# Patient Record
Sex: FEMALE | Race: WHITE | NOT HISPANIC OR LATINO | URBAN - METROPOLITAN AREA
[De-identification: names, ages, dates, MRNs, and addresses within clinical notes are randomized per-mention and may not be internally consistent; named-entity substitution may affect disease eponyms.]

---

## 2019-02-09 ENCOUNTER — EMERGENCY (EMERGENCY)
Facility: HOSPITAL | Age: 34
LOS: 1 days | Discharge: HOME | End: 2019-02-09
Admitting: EMERGENCY MEDICINE
Payer: COMMERCIAL

## 2019-02-09 VITALS
RESPIRATION RATE: 17 BRPM | DIASTOLIC BLOOD PRESSURE: 75 MMHG | TEMPERATURE: 99 F | HEART RATE: 96 BPM | SYSTOLIC BLOOD PRESSURE: 134 MMHG | OXYGEN SATURATION: 95 % | WEIGHT: 149.91 LBS

## 2019-02-09 PROCEDURE — 99284 EMERGENCY DEPT VISIT MOD MDM: CPT

## 2019-02-09 PROCEDURE — 96365 THER/PROPH/DIAG IV INF INIT: CPT

## 2019-02-09 PROCEDURE — 99284 EMERGENCY DEPT VISIT MOD MDM: CPT | Mod: 25

## 2019-02-09 RX ORDER — DEXAMETHASONE 0.5 MG/5ML
10 ELIXIR ORAL ONCE
Qty: 0 | Refills: 0 | Status: COMPLETED | OUTPATIENT
Start: 2019-02-09 | End: 2019-02-09

## 2019-02-09 RX ORDER — SODIUM CHLORIDE 9 MG/ML
1000 INJECTION INTRAMUSCULAR; INTRAVENOUS; SUBCUTANEOUS ONCE
Qty: 0 | Refills: 0 | Status: COMPLETED | OUTPATIENT
Start: 2019-02-09 | End: 2019-02-09

## 2019-02-09 RX ADMIN — SODIUM CHLORIDE 1000 MILLILITER(S): 9 INJECTION INTRAMUSCULAR; INTRAVENOUS; SUBCUTANEOUS at 16:48

## 2019-02-09 RX ADMIN — Medication 102 MILLIGRAM(S): at 16:05

## 2019-02-09 RX ADMIN — Medication 10 MILLIGRAM(S): at 16:48

## 2019-02-09 RX ADMIN — SODIUM CHLORIDE 2000 MILLILITER(S): 9 INJECTION INTRAMUSCULAR; INTRAVENOUS; SUBCUTANEOUS at 16:05

## 2019-02-09 NOTE — ED ADULT NURSE NOTE - NSIMPLEMENTINTERV_GEN_ALL_ED
Implemented All Universal Safety Interventions:  [place] to call system. Call bell, personal items and telephone within reach. Instruct patient to call for assistance. Room bathroom lighting operational. Non-slip footwear when patient is off stretcher. Physically safe environment: no spills, clutter or unnecessary equipment. Stretcher in lowest position, wheels locked, appropriate side rails in place.

## 2019-02-09 NOTE — ED ADULT TRIAGE NOTE - PRO INTERPRETER NEED 2
Discussion/Summary   Dear Noam Olson,   Your recent labs show prediabetes. Let me know if you want to see the nutritionist to discuss dietary modification and get as much exercise as possible. Cholesterol looks ok. Let me know if you have any questions. Thanks, Tara Sebastian PA-C        Verified Results  CBC WITH AUTOMATED DIFFERENTIAL [id number] 08:28AM Tara Sebastian     Test Name Result Flag Reference   WHITE BLOOD COUNT 5.9 K/mcL  4.2-11.0   RED CELL COUNT 4.71 mil/mcL  4.00-5.20   HEMOGLOBIN 12.9 g/dl  12.0-15.5   HEMATOCRIT 41.1 %  36.0-46.5   MEAN CORPUSCULAR VOLUME 87.3 fL  78.0-100.0   MEAN CORPUSCULAR HEMOGLOBIN 27.4 pg  26.0-34.0   MEAN CORPUSCULAR HGB CONC 31.4 g/dl L 32.0-36.5   RDW-CV 14.1 %  11.0-15.0   PLATELET COUNT 250 K/mcL  140-450   GEORGES% 52 %     LYM% 35 %     MON% 9 %     EOS% 2 %     BASO% 1 %     GEORGES ABS 3.1 K/mcL  1.8-7.7   LYM ABS 2.1 K/mcL  1.0-4.8   MON ABS 0.5 K/mcL  0.3-0.9   EOS ABS 0.1 K/mcL  0.1-0.5   BASO ABS 0.0 K/mcL  0.0-0.3   DIFF TYPE      AUTOMATED DIFFERENTIAL   NRBC 0 /100 WBC  0   PERCENT IMMATURE GRANULOCYTES 1 %     ABSOLUTE IMMATURE GRANULOCYTES 0.0 K/mcL  0-0.2     TSH WITH REFLEX [id number] 08:28AM Tara Sebastian     Test Name Result Flag Reference   TSH 1.607 mcUnits/mL  0.350-5.000   Findings most consistent with euthyroid state, no additional testing suggested. TSH may be normal in patients with thyroid dysfunction and pituitary disease. Clinical correlation recommended. (Reflex TSH algorithm is not recommended in hospitalized patients. A variety of drugs, as well as serious acute and chronic illnesses may alter thyroid function tests.  Commonly implicated drugs include glucocorticoids, dopamine, carbamazepine, iodine, amiodarone, lithium and heparin.)     HEMOGLOBIN A1C GLYCOSYLATED [id number] 08:28AM DEMETRIA GONZALEZ     Test Name Result Flag Reference   HEMOGLOBIN A1C GLYH 6.2 % H 4.5-5.6   ----DIABETIC SCREENING---  NON DIABETIC <5.7%  INCREASED RISK                5.7-6.4%  DIAGNOSTIC FOR DIABETES      >6.4%     ----DIABETIC CONTROL---     A1C%           eAG mg/dL  6.0            126  6.5            140  7.0            154  7.5            169  8.0            183  8.5            197  9.0            212  9.5            226  10.0           240     LIPID PNL W/ RFX [id number] 08:28AM DEMETRIA GONZALEZ     Test Name Result Flag Reference   FASTING STATUS UNKNOWN hrs     CHOLESTEROL 188 mg/dl  <200   Desirable            <200  Borderline High      200 to 239  High                 >=240   HDL CHOLESTEROL 48 mg/dl L >49   Low            <40  Borderline Low 40 to 49  Near Optimal   50 to 59  Optimal        >=60   TRIGLYCERIDES 102 mg/dl  <150   Normal                   <150  Borderline High          150 to 199  High                     200 to 499  Very High                >=500   LDL CHOLESTEROL (CALCULATED) 120 mg/dl  <130   OPTIMAL               <100  NEAR OPTIMAL          100-129  BORDERLINE HIGH       130-159  HIGH                  160-189  VERY HIGH             >=190   NON-HDL CHOLESTEROL 140 mg/dl     Therapeutic Target:  CHD and risk equivalents <130  Multiple risk factors    <160  0 to 1 risk factors      <190   CHOLESTEROL/HDL RATIO 3.9  <4.5 English

## 2019-02-09 NOTE — ED PROVIDER NOTE - OBJECTIVE STATEMENT
34 y/o female with no sig PMHx c/o sore throat x 4 days. pt states difficulty swallowing due to pain. + fever. Pt reports temp 101 yesterday. no change in voice. pt seen by PMD yesterday and started on amoxicillin. pt denies cough, sob, or wheezing. no nasal congestion. no ha or dizziness. no further complaints.

## 2019-02-09 NOTE — ED ADULT NURSE NOTE - CHPI ED NUR SYMPTOMS NEG
no edema/no shortness of breath/no headache/no body aches/no diaphoresis/no fever/no wheezing/no chills/no cough/no hemoptysis/no chest pain

## 2019-02-09 NOTE — ED PROVIDER NOTE - NSFOLLOWUPINSTRUCTIONS_ED_ALL_ED_FT
Follow up with your physician in 2 days.       Strep Throat  Strep throat is a bacterial infection of the throat. Your health care provider may call the infection tonsillitis or pharyngitis, depending on whether there is swelling in the tonsils or at the back of the throat. Strep throat is most common during the cold months of the year in children who are 5–15 years of age, but it can happen during any season in people of any age. This infection is spread from person to person (contagious) through coughing, sneezing, or close contact.    What are the causes?  Strep throat is caused by the bacteria called Streptococcus pyogenes.    What increases the risk?  This condition is more likely to develop in:    People who spend time in crowded places where the infection can spread easily.  People who have close contact with someone who has strep throat.    What are the signs or symptoms?  Symptoms of this condition include:    Fever or chills.  Redness, swelling, or pain in the tonsils or throat.  Pain or difficulty when swallowing.  White or yellow spots on the tonsils or throat.  Swollen, tender glands in the neck or under the jaw.  Red rash all over the body (rare).    How is this diagnosed?  This condition is diagnosed by performing a rapid strep test or by taking a swab of your throat (throat culture test). Results from a rapid strep test are usually ready in a few minutes, but throat culture test results are available after one or two days.    How is this treated?  This condition is treated with antibiotic medicine.    Follow these instructions at home:  Medicines     Take over-the-counter and prescription medicines only as told by your health care provider.  Take your antibiotic as told by your health care provider. Do not stop taking the antibiotic even if you start to feel better.  Have family members who also have a sore throat or fever tested for strep throat. They may need antibiotics if they have the strep infection.  Eating and drinking     Do not share food, drinking cups, or personal items that could cause the infection to spread to other people.  If swallowing is difficult, try eating soft foods until your sore throat feels better.  Drink enough fluid to keep your urine clear or pale yellow.  General instructions     Gargle with a salt-water mixture 3–4 times per day or as needed. To make a salt-water mixture, completely dissolve ½–1 tsp of salt in 1 cup of warm water.  Make sure that all household members wash their hands well.  Get plenty of rest.  Stay home from school or work until you have been taking antibiotics for 24 hours.  Keep all follow-up visits as told by your health care provider. This is important.  Contact a health care provider if:  The glands in your neck continue to get bigger.  You develop a rash, cough, or earache.  You cough up a thick liquid that is green, yellow-brown, or bloody.  You have pain or discomfort that does not get better with medicine.  Your problems seem to be getting worse rather than better.  You have a fever.  Get help right away if:  You have new symptoms, such as vomiting, severe headache, stiff or painful neck, chest pain, or shortness of breath.  You have severe throat pain, drooling, or changes in your voice.  You have swelling of the neck, or the skin on the neck becomes red and tender.  You have signs of dehydration, such as fatigue, dry mouth, and decreased urination.  You become increasingly sleepy, or you cannot wake up completely.  Your joints become red or painful.  This information is not intended to replace advice given to you by your health care provider. Make sure you discuss any questions you have with your health care provider.    Document Released: 12/15/2001 Document Revised: 08/16/2017 Document Reviewed: 04/11/2016  [employer] Patient Education © 2018 [employer]

## 2019-02-09 NOTE — ED PROVIDER NOTE - MEDICAL DECISION MAKING DETAILS
pharyngitis.  pt tolerating po. a febrile in ED. no evidence of abscess on exam. fluid and decardon given and pt reports improvement in pain. will d/c home to continue amoxicillin and will give short course of percocet for the pain. return precautions d/w pt.

## 2019-02-09 NOTE — ED ADULT TRIAGE NOTE - CHIEF COMPLAINT QUOTE
Patient complaining of sore throat since Wednesday and fevers.  Patient states she started Amoxicillin yesterday.  Patient denies any N/V, or any other complaints at this time.

## 2019-02-09 NOTE — ED ADULT NURSE REASSESSMENT NOTE - NS ED NURSE REASSESS COMMENT FT1
Sore throat improved s/p meds, no new symptom complaint, vital signs stable, discharged to home in stable condition.

## 2019-02-09 NOTE — ED PROVIDER NOTE - ENMT, MLM
Airway patent, Nasal mucosa clear. Mouth with normal mucosa. 2 + tonsils b/l with exudate. mild erythema to posterior pharynx. uvula midline. + tender anterior cervical adenopathy. Airway patent, Nasal mucosa clear. Mouth with normal mucosa. 2 + tonsil left and 1+ tonsil on right. mild erythema to posterior pharynx. uvula midline. + tender anterior cervical adenopathy.

## 2019-02-13 DIAGNOSIS — J02.9 ACUTE PHARYNGITIS, UNSPECIFIED: ICD-10-CM

## 2019-02-13 DIAGNOSIS — Z88.2 ALLERGY STATUS TO SULFONAMIDES: ICD-10-CM

## 2019-02-13 DIAGNOSIS — R50.9 FEVER, UNSPECIFIED: ICD-10-CM

## 2019-02-13 DIAGNOSIS — R13.10 DYSPHAGIA, UNSPECIFIED: ICD-10-CM
